# Patient Record
Sex: FEMALE | Race: WHITE | NOT HISPANIC OR LATINO | URBAN - METROPOLITAN AREA
[De-identification: names, ages, dates, MRNs, and addresses within clinical notes are randomized per-mention and may not be internally consistent; named-entity substitution may affect disease eponyms.]

---

## 2018-05-13 ENCOUNTER — OUTPATIENT (OUTPATIENT)
Dept: OUTPATIENT SERVICES | Facility: HOSPITAL | Age: 28
LOS: 1 days | End: 2018-05-13
Payer: COMMERCIAL

## 2018-05-13 DIAGNOSIS — O26.899 OTHER SPECIFIED PREGNANCY RELATED CONDITIONS, UNSPECIFIED TRIMESTER: ICD-10-CM

## 2018-05-13 DIAGNOSIS — Z3A.00 WEEKS OF GESTATION OF PREGNANCY NOT SPECIFIED: ICD-10-CM

## 2018-05-13 LAB — AMNISURE ROM (RUPTURE OF MEMBRANES): NEGATIVE — SIGNIFICANT CHANGE UP

## 2018-05-13 PROCEDURE — 84112 EVAL AMNIOTIC FLUID PROTEIN: CPT

## 2018-05-13 PROCEDURE — 99214 OFFICE O/P EST MOD 30 MIN: CPT

## 2018-05-28 ENCOUNTER — OUTPATIENT (OUTPATIENT)
Dept: OUTPATIENT SERVICES | Facility: HOSPITAL | Age: 28
LOS: 1 days | End: 2018-05-28
Payer: COMMERCIAL

## 2018-05-28 DIAGNOSIS — Z3A.00 WEEKS OF GESTATION OF PREGNANCY NOT SPECIFIED: ICD-10-CM

## 2018-05-28 DIAGNOSIS — O26.899 OTHER SPECIFIED PREGNANCY RELATED CONDITIONS, UNSPECIFIED TRIMESTER: ICD-10-CM

## 2018-05-28 LAB
AMYLASE P1 CFR SERPL: 105 U/L — SIGNIFICANT CHANGE UP (ref 25–125)
ANION GAP SERPL CALC-SCNC: 12 MMOL/L — SIGNIFICANT CHANGE UP (ref 5–17)
BUN SERPL-MCNC: 9 MG/DL — SIGNIFICANT CHANGE UP (ref 7–23)
CALCIUM SERPL-MCNC: 8.5 MG/DL — SIGNIFICANT CHANGE UP (ref 8.4–10.5)
CHLORIDE SERPL-SCNC: 102 MMOL/L — SIGNIFICANT CHANGE UP (ref 96–108)
CO2 SERPL-SCNC: 23 MMOL/L — SIGNIFICANT CHANGE UP (ref 22–31)
CREAT SERPL-MCNC: 0.78 MG/DL — SIGNIFICANT CHANGE UP (ref 0.5–1.3)
GLUCOSE SERPL-MCNC: 81 MG/DL — SIGNIFICANT CHANGE UP (ref 70–99)
LIDOCAIN IGE QN: 70 U/L — HIGH (ref 7–60)
POTASSIUM SERPL-MCNC: 3.7 MMOL/L — SIGNIFICANT CHANGE UP (ref 3.5–5.3)
POTASSIUM SERPL-SCNC: 3.7 MMOL/L — SIGNIFICANT CHANGE UP (ref 3.5–5.3)
SODIUM SERPL-SCNC: 137 MMOL/L — SIGNIFICANT CHANGE UP (ref 135–145)

## 2018-05-28 PROCEDURE — 36415 COLL VENOUS BLD VENIPUNCTURE: CPT

## 2018-05-28 PROCEDURE — 80048 BASIC METABOLIC PNL TOTAL CA: CPT

## 2018-05-28 PROCEDURE — 82150 ASSAY OF AMYLASE: CPT

## 2018-05-28 PROCEDURE — 83690 ASSAY OF LIPASE: CPT

## 2018-05-28 PROCEDURE — 99214 OFFICE O/P EST MOD 30 MIN: CPT

## 2018-06-12 ENCOUNTER — INPATIENT (INPATIENT)
Facility: HOSPITAL | Age: 28
LOS: 1 days | Discharge: HOME | End: 2018-06-14
Attending: OBSTETRICS & GYNECOLOGY | Admitting: OBSTETRICS & GYNECOLOGY
Payer: COMMERCIAL

## 2018-06-12 VITALS — WEIGHT: 162.26 LBS | HEIGHT: 65 IN

## 2018-06-12 LAB
BASOPHILS NFR BLD AUTO: 0.2 % — SIGNIFICANT CHANGE UP (ref 0–2)
EOSINOPHIL NFR BLD AUTO: 1.8 % — SIGNIFICANT CHANGE UP (ref 0–6)
HCT VFR BLD CALC: 35.3 % — SIGNIFICANT CHANGE UP (ref 34.5–45)
HGB BLD-MCNC: 11.8 G/DL — SIGNIFICANT CHANGE UP (ref 11.5–15.5)
LYMPHOCYTES # BLD AUTO: 27.8 % — SIGNIFICANT CHANGE UP (ref 13–44)
MCHC RBC-ENTMCNC: 28.2 PG — SIGNIFICANT CHANGE UP (ref 27–34)
MCHC RBC-ENTMCNC: 33.4 G/DL — SIGNIFICANT CHANGE UP (ref 32–36)
MCV RBC AUTO: 84.4 FL — SIGNIFICANT CHANGE UP (ref 80–100)
MONOCYTES NFR BLD AUTO: 6.8 % — SIGNIFICANT CHANGE UP (ref 2–14)
NEUTROPHILS NFR BLD AUTO: 63.4 % — SIGNIFICANT CHANGE UP (ref 43–77)
PLATELET # BLD AUTO: 301 K/UL — SIGNIFICANT CHANGE UP (ref 150–400)
RBC # BLD: 4.18 M/UL — SIGNIFICANT CHANGE UP (ref 3.8–5.2)
RBC # FLD: 13.3 % — SIGNIFICANT CHANGE UP (ref 10.3–16.9)
RH IG SCN BLD-IMP: POSITIVE — SIGNIFICANT CHANGE UP
WBC # BLD: 8.7 K/UL — SIGNIFICANT CHANGE UP (ref 3.8–10.5)
WBC # FLD AUTO: 8.7 K/UL — SIGNIFICANT CHANGE UP (ref 3.8–10.5)

## 2018-06-12 RX ORDER — SODIUM CHLORIDE 9 MG/ML
500 INJECTION, SOLUTION INTRAVENOUS ONCE
Qty: 0 | Refills: 0 | Status: COMPLETED | OUTPATIENT
Start: 2018-06-12 | End: 2018-06-12

## 2018-06-12 RX ORDER — OXYTOCIN 10 UNIT/ML
1 VIAL (ML) INJECTION
Qty: 30 | Refills: 0 | Status: DISCONTINUED | OUTPATIENT
Start: 2018-06-12 | End: 2018-06-12

## 2018-06-12 RX ORDER — AER TRAVELER 0.5 G/1
1 SOLUTION RECTAL; TOPICAL EVERY 4 HOURS
Qty: 0 | Refills: 0 | Status: DISCONTINUED | OUTPATIENT
Start: 2018-06-12 | End: 2018-06-14

## 2018-06-12 RX ORDER — TETANUS TOXOID, REDUCED DIPHTHERIA TOXOID AND ACELLULAR PERTUSSIS VACCINE, ADSORBED 5; 2.5; 8; 8; 2.5 [IU]/.5ML; [IU]/.5ML; UG/.5ML; UG/.5ML; UG/.5ML
0.5 SUSPENSION INTRAMUSCULAR ONCE
Qty: 0 | Refills: 0 | Status: DISCONTINUED | OUTPATIENT
Start: 2018-06-12 | End: 2018-06-14

## 2018-06-12 RX ORDER — IBUPROFEN 200 MG
600 TABLET ORAL EVERY 6 HOURS
Qty: 0 | Refills: 0 | Status: DISCONTINUED | OUTPATIENT
Start: 2018-06-12 | End: 2018-06-14

## 2018-06-12 RX ORDER — DOCUSATE SODIUM 100 MG
100 CAPSULE ORAL
Qty: 0 | Refills: 0 | Status: DISCONTINUED | OUTPATIENT
Start: 2018-06-12 | End: 2018-06-14

## 2018-06-12 RX ORDER — DIPHENHYDRAMINE HCL 50 MG
25 CAPSULE ORAL EVERY 6 HOURS
Qty: 0 | Refills: 0 | Status: DISCONTINUED | OUTPATIENT
Start: 2018-06-12 | End: 2018-06-14

## 2018-06-12 RX ORDER — SODIUM CHLORIDE 9 MG/ML
1000 INJECTION, SOLUTION INTRAVENOUS
Qty: 0 | Refills: 0 | Status: DISCONTINUED | OUTPATIENT
Start: 2018-06-12 | End: 2018-06-12

## 2018-06-12 RX ORDER — SIMETHICONE 80 MG/1
80 TABLET, CHEWABLE ORAL EVERY 6 HOURS
Qty: 0 | Refills: 0 | Status: DISCONTINUED | OUTPATIENT
Start: 2018-06-12 | End: 2018-06-14

## 2018-06-12 RX ORDER — SODIUM CHLORIDE 9 MG/ML
3 INJECTION INTRAMUSCULAR; INTRAVENOUS; SUBCUTANEOUS EVERY 8 HOURS
Qty: 0 | Refills: 0 | Status: DISCONTINUED | OUTPATIENT
Start: 2018-06-12 | End: 2018-06-13

## 2018-06-12 RX ORDER — ACETAMINOPHEN 500 MG
650 TABLET ORAL EVERY 6 HOURS
Qty: 0 | Refills: 0 | Status: DISCONTINUED | OUTPATIENT
Start: 2018-06-12 | End: 2018-06-14

## 2018-06-12 RX ORDER — HYDROCORTISONE 1 %
1 OINTMENT (GRAM) TOPICAL EVERY 4 HOURS
Qty: 0 | Refills: 0 | Status: DISCONTINUED | OUTPATIENT
Start: 2018-06-12 | End: 2018-06-14

## 2018-06-12 RX ORDER — OXYTOCIN 10 UNIT/ML
333.33 VIAL (ML) INJECTION
Qty: 20 | Refills: 0 | Status: DISCONTINUED | OUTPATIENT
Start: 2018-06-12 | End: 2018-06-12

## 2018-06-12 RX ORDER — SODIUM CHLORIDE 9 MG/ML
50 INJECTION, SOLUTION INTRAVENOUS ONCE
Qty: 0 | Refills: 0 | Status: DISCONTINUED | OUTPATIENT
Start: 2018-06-12 | End: 2018-06-12

## 2018-06-12 RX ORDER — DIBUCAINE 1 %
1 OINTMENT (GRAM) RECTAL EVERY 4 HOURS
Qty: 0 | Refills: 0 | Status: DISCONTINUED | OUTPATIENT
Start: 2018-06-12 | End: 2018-06-14

## 2018-06-12 RX ORDER — SODIUM CHLORIDE 9 MG/ML
1000 INJECTION, SOLUTION INTRAVENOUS ONCE
Qty: 0 | Refills: 0 | Status: COMPLETED | OUTPATIENT
Start: 2018-06-12 | End: 2018-06-12

## 2018-06-12 RX ORDER — OXYTOCIN 10 UNIT/ML
41.67 VIAL (ML) INJECTION
Qty: 20 | Refills: 0 | Status: DISCONTINUED | OUTPATIENT
Start: 2018-06-12 | End: 2018-06-14

## 2018-06-12 RX ORDER — LANOLIN
1 OINTMENT (GRAM) TOPICAL EVERY 6 HOURS
Qty: 0 | Refills: 0 | Status: DISCONTINUED | OUTPATIENT
Start: 2018-06-12 | End: 2018-06-14

## 2018-06-12 RX ORDER — MAGNESIUM HYDROXIDE 400 MG/1
30 TABLET, CHEWABLE ORAL
Qty: 0 | Refills: 0 | Status: DISCONTINUED | OUTPATIENT
Start: 2018-06-12 | End: 2018-06-14

## 2018-06-12 RX ORDER — PRAMOXINE HYDROCHLORIDE 150 MG/15G
1 AEROSOL, FOAM RECTAL EVERY 4 HOURS
Qty: 0 | Refills: 0 | Status: DISCONTINUED | OUTPATIENT
Start: 2018-06-12 | End: 2018-06-14

## 2018-06-12 RX ADMIN — SODIUM CHLORIDE 2000 MILLILITER(S): 9 INJECTION, SOLUTION INTRAVENOUS at 16:45

## 2018-06-12 RX ADMIN — Medication 125 MILLIUNIT(S)/MIN: at 19:46

## 2018-06-12 RX ADMIN — SODIUM CHLORIDE 125 MILLILITER(S): 9 INJECTION, SOLUTION INTRAVENOUS at 07:50

## 2018-06-12 RX ADMIN — SODIUM CHLORIDE 125 MILLILITER(S): 9 INJECTION, SOLUTION INTRAVENOUS at 18:01

## 2018-06-12 RX ADMIN — Medication 1 MILLIUNIT(S)/MIN: at 09:20

## 2018-06-12 RX ADMIN — SODIUM CHLORIDE 1000 MILLILITER(S): 9 INJECTION, SOLUTION INTRAVENOUS at 19:03

## 2018-06-13 LAB — T PALLIDUM AB TITR SER: NEGATIVE — SIGNIFICANT CHANGE UP

## 2018-06-13 RX ADMIN — Medication 600 MILLIGRAM(S): at 06:02

## 2018-06-13 RX ADMIN — Medication 650 MILLIGRAM(S): at 10:36

## 2018-06-13 RX ADMIN — SODIUM CHLORIDE 3 MILLILITER(S): 9 INJECTION INTRAMUSCULAR; INTRAVENOUS; SUBCUTANEOUS at 06:03

## 2018-06-13 RX ADMIN — Medication 600 MILLIGRAM(S): at 18:06

## 2018-06-13 RX ADMIN — Medication 600 MILLIGRAM(S): at 12:06

## 2018-06-13 RX ADMIN — Medication 100 MILLIGRAM(S): at 12:07

## 2018-06-13 RX ADMIN — Medication 600 MILLIGRAM(S): at 01:20

## 2018-06-13 RX ADMIN — Medication 600 MILLIGRAM(S): at 13:00

## 2018-06-13 RX ADMIN — Medication 650 MILLIGRAM(S): at 11:15

## 2018-06-13 RX ADMIN — Medication 600 MILLIGRAM(S): at 18:48

## 2018-06-13 RX ADMIN — Medication 1 TABLET(S): at 12:06

## 2018-06-13 RX ADMIN — SODIUM CHLORIDE 3 MILLILITER(S): 9 INJECTION INTRAMUSCULAR; INTRAVENOUS; SUBCUTANEOUS at 12:07

## 2018-06-13 RX ADMIN — Medication 600 MILLIGRAM(S): at 00:44

## 2018-06-13 RX ADMIN — Medication 600 MILLIGRAM(S): at 06:33

## 2018-06-13 NOTE — DISCHARGE NOTE OB - CARE PLAN
Goal:	recover from birth  Assessment and plan of treatment:	vaginal rest x6 weeks. no lifting no bath

## 2018-06-13 NOTE — PROGRESS NOTE ADULT - SUBJECTIVE AND OBJECTIVE BOX
Patient evaluated at bedside.  No acute events overnight.  She reports pain is well controlled with Motrin.   She denies heavy vaginal bleeding or perineal discomfort.  She has been ambulating without assistance, voiding spontaneously, tolerating a regular diet and is breastfeeding. No complaints at this time.     Physical Exam:  T(C): 37 (06-13-18 @ 05:51), Max: 37.1 (06-12-18 @ 22:15)  HR: 86 (06-13-18 @ 05:51) (86 - 107)  BP: 102/64 (06-13-18 @ 05:51) (102/64 - 138/73)  RR: 18 (06-13-18 @ 05:51) (16 - 18)  SpO2: 98% (06-13-18 @ 05:51) (98% - 100%)  Wt(kg): --    GA: NAD, resting comfortably   Abd: soft, nontender, nondistended, no rebound or guarding, uterus firm at midline and below umbilicus  : lochia WNL  Extremities: no swelling or calf tenderness                            11.8   8.7   )-----------( 301      ( 12 Jun 2018 08:12 )             35.3

## 2018-06-13 NOTE — DISCHARGE NOTE OB - PATIENT PORTAL LINK FT
You can access the [facility] Patient Portal, offered by [facility], by registering with the following website: [email or web address]

## 2018-06-13 NOTE — LACTATION INITIAL EVALUATION - REQUESTED BY
lactation rounds, initial visit.  delivered @ 393/7 weeks gestation via vaginal delivery. The mother's nurse and the mother report that breast feeding is going well. The mother breast fed the  shortly before I arrived @ her bedside and the  is asleep. The mother reports that she breast fed her first child for one year and she is comfortable with breast feeding. The mother declined a f/u visit for the latch to be checked. The mother reported that her night nurse checked the latch and she said the  is breast feeding with a deep latch. The  has had 4 voids and 2 stools since delivery. S2S was encouraged. Breast feeding guidelines and the minimum output requirements for the  were reviewed with the mother. All questions were answered and the mother verbalized understanding. I encouraged the mother to ask for lactation assistance as needed.

## 2018-06-14 VITALS
SYSTOLIC BLOOD PRESSURE: 105 MMHG | RESPIRATION RATE: 18 BRPM | HEART RATE: 71 BPM | TEMPERATURE: 99 F | OXYGEN SATURATION: 98 % | DIASTOLIC BLOOD PRESSURE: 70 MMHG

## 2018-06-14 PROCEDURE — 86850 RBC ANTIBODY SCREEN: CPT

## 2018-06-14 PROCEDURE — 36415 COLL VENOUS BLD VENIPUNCTURE: CPT

## 2018-06-14 PROCEDURE — 86780 TREPONEMA PALLIDUM: CPT

## 2018-06-14 PROCEDURE — 86901 BLOOD TYPING SEROLOGIC RH(D): CPT

## 2018-06-14 PROCEDURE — 85025 COMPLETE CBC W/AUTO DIFF WBC: CPT

## 2018-06-14 PROCEDURE — 90707 MMR VACCINE SC: CPT

## 2018-06-14 PROCEDURE — 86900 BLOOD TYPING SEROLOGIC ABO: CPT

## 2018-06-14 RX ADMIN — Medication 1 TABLET(S): at 12:50

## 2018-06-14 RX ADMIN — Medication 600 MILLIGRAM(S): at 06:07

## 2018-06-14 RX ADMIN — Medication 600 MILLIGRAM(S): at 13:50

## 2018-06-14 RX ADMIN — Medication 600 MILLIGRAM(S): at 00:20

## 2018-06-14 RX ADMIN — Medication 600 MILLIGRAM(S): at 00:10

## 2018-06-14 RX ADMIN — Medication 600 MILLIGRAM(S): at 12:50

## 2018-06-14 RX ADMIN — Medication 100 MILLIGRAM(S): at 00:10

## 2018-06-14 RX ADMIN — Medication 600 MILLIGRAM(S): at 06:35

## 2018-06-14 RX ADMIN — Medication 0.5 MILLILITER(S): at 12:49

## 2018-06-14 NOTE — PROGRESS NOTE ADULT - SUBJECTIVE AND OBJECTIVE BOX
Patient evaluated at bedside this morning, resting comfortable in bed, no acute events overnight.  She reports pain is well controlled with motrin. Feeling well and ready to go home this morning.   She denies headache, dizziness, chest pain, palpitations, shortness of breath, nausea, vomiting, heavy vaginal bleeding or perineal discomfort. Reports decrease in amount of vaginal bleeding and denies clots.  She has been ambulating without assistance, voiding spontaneously, and is breastfeeding.   Tolerating food well, without nausea/vomit.  Passing flatus.     Physical Exam:  T(C): 36.6 (06-14-18 @ 06:13), Max: 36.6 (06-14-18 @ 06:13)  HR: 81 (06-14-18 @ 06:13) (81 - 81)  BP: 113/75 (06-14-18 @ 06:13) (113/75 - 113/75)  RR: 18 (06-14-18 @ 06:13) (18 - 18)  SpO2: 98% (06-14-18 @ 06:13) (98% - 98%)    GA: NAD, A&O x 3  CV: RRR, no murmurs, rubs, or gallops  Pulm: clear breath sounds throughout, no rales, rhonchi, wheezes  Abd: + BS, soft, nontender, nondistended, no rebound or guarding, uterus firm at midline, uterus firm and   2 fb below umbilicus  Extremities: no swelling or calf tenderness                          11.8   8.7   )-----------( 301      ( 12 Jun 2018 08:12 )             35.3           acetaminophen   Tablet. 650 milliGRAM(s) Oral every 6 hours PRN  dibucaine 1% Ointment 1 Application(s) Topical every 4 hours PRN  diphenhydrAMINE   Capsule 25 milliGRAM(s) Oral every 6 hours PRN  diphtheria/tetanus/pertussis (acellular) Vaccine (ADAcel) 0.5 milliLiter(s) IntraMuscular once  docusate sodium 100 milliGRAM(s) Oral two times a day PRN  hydrocortisone 1% Cream 1 Application(s) Topical every 4 hours PRN  ibuprofen  Tablet 600 milliGRAM(s) Oral every 6 hours  lanolin Ointment 1 Application(s) Topical every 6 hours PRN  magnesium hydroxide Suspension 30 milliLiter(s) Oral two times a day PRN  oxytocin Infusion 41.667 milliUNIT(s)/Min IV Continuous <Continuous>  pramoxine 1%/zinc 5% Cream 1 Application(s) Topical every 4 hours PRN  prenatal multivitamin 1 Tablet(s) Oral daily  simethicone 80 milliGRAM(s) Chew every 6 hours PRN  witch hazel Pads 1 Application(s) Topical every 4 hours PRN

## 2018-06-14 NOTE — PROGRESS NOTE ADULT - ASSESSMENT
A/P 28y s/p , PPD #2  , stable, meeting postpartum milestones   - Pain: well controlled on motrin  - GI: Tolerating regular diet, colace PRN  - : urinating without difficulty/pain  -DVT prophylaxis: ambulating frequently  -Dispo: PPD 2, d/c discussed

## 2018-06-17 DIAGNOSIS — Z34.83 ENCOUNTER FOR SUPERVISION OF OTHER NORMAL PREGNANCY, THIRD TRIMESTER: ICD-10-CM

## 2018-06-17 DIAGNOSIS — Z3A.39 39 WEEKS GESTATION OF PREGNANCY: ICD-10-CM
